# Patient Record
Sex: FEMALE | Race: AMERICAN INDIAN OR ALASKA NATIVE
[De-identification: names, ages, dates, MRNs, and addresses within clinical notes are randomized per-mention and may not be internally consistent; named-entity substitution may affect disease eponyms.]

---
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
## 2020-11-23 ENCOUNTER — HOSPITAL ENCOUNTER (EMERGENCY)
Dept: HOSPITAL 5 - ED | Age: 20
LOS: 1 days | Discharge: HOME | End: 2020-11-24
Payer: MEDICAID

## 2020-11-23 VITALS — SYSTOLIC BLOOD PRESSURE: 133 MMHG | DIASTOLIC BLOOD PRESSURE: 79 MMHG

## 2020-11-23 DIAGNOSIS — Z79.899: ICD-10-CM

## 2020-11-23 DIAGNOSIS — L02.31: Primary | ICD-10-CM

## 2020-11-23 PROCEDURE — 99282 EMERGENCY DEPT VISIT SF MDM: CPT

## 2020-11-24 NOTE — EMERGENCY DEPARTMENT REPORT
- General


Chief complaint: Skin/Abscess/Foreign Body


Stated complaint: ABSCESS ON BUTTOCKS


Time Seen by Provider: 11/24/20 00:48


Source: patient


Mode of arrival: Ambulatory


Limitations: No Limitations





- History of Present Illness


Initial comments: 





pt is a 20-year-old female presents emergency room with complaints of an abscess

to the left buttock that began a week ago but worsened in the last 3 days.  She 

states that it has been increasing in size and becoming more painful.  She 

states that she has noticed a small amount of drainage.  She states that she has

been having chills but denies any fever.  She denies any nausea, vomiting, 

diarrhea, rectal pain.  She states that she has had these a few times in the 

past in the same area.  She states that she has had to have I&D's in the past.  

No past medical history.  No allergies to medications.  Last menstrual cycle 

11/5/2020.





- Related Data


                                  Previous Rx's











 Medication  Instructions  Recorded  Last Taken  Type


 


Clindamycin [Clindamycin CAP] 300 mg PO Q8H 7 Days  cap 04/29/18 Unknown Rx


 


Ibuprofen [Motrin] 600 mg PO Q8H PRN #30 tablet 04/29/18 Unknown Rx


 


Nystas/Diphen/Xyl Visc/Mylanta 15 ml MM Q4H PRN 7 Days  ml 04/29/18 Unknown Rx





[Magic Mouthwash]    


 


Prednisone [predniSONE 10 mg 10 mg PO .TAPER #1 tab.ds.pk 04/29/18 Unknown Rx





(6-Day Pack, 21 Tabs)]    


 


Clindamycin [Clindamycin CAP] 300 mg PO Q8H 10 Days #30 cap 11/11/18 Unknown Rx


 


Ibuprofen [Motrin] 800 mg PO Q8HR PRN #12 tablet 11/11/18 Unknown Rx


 


Acetaminophen/Codeine [Tylenol 1 tab PO Q6H PRN #10 tab 11/24/20 Unknown Rx





/Codeine # 3 tab]    


 


Sulfamethoxazole/Trimethoprim 1 each PO BID 7 Days #14 tablet 11/24/20 Unknown 

Rx





[Bactrim DS TAB]    











                                    Allergies











Allergy/AdvReac Type Severity Reaction Status Date / Time


 


No Known Allergies Allergy   Verified 11/11/18 19:32














Abscess Boil HPI





- HPI


Chief Complaint: Skin/Abscess/Foreign Body


Stated Complaint: ABSCESS ON BUTTOCKS


Time Seen by Provider: 11/24/20 00:48


Home Medications: 


                                  Previous Rx's











 Medication  Instructions  Recorded  Last Taken  Type


 


Clindamycin [Clindamycin CAP] 300 mg PO Q8H 7 Days  cap 04/29/18 Unknown Rx


 


Ibuprofen [Motrin] 600 mg PO Q8H PRN #30 tablet 04/29/18 Unknown Rx


 


Nystas/Diphen/Xyl Visc/Mylanta 15 ml MM Q4H PRN 7 Days  ml 04/29/18 Unknown Rx





[Magic Mouthwash]    


 


Prednisone [predniSONE 10 mg 10 mg PO .TAPER #1 tab.ds.pk 04/29/18 Unknown Rx





(6-Day Pack, 21 Tabs)]    


 


Clindamycin [Clindamycin CAP] 300 mg PO Q8H 10 Days #30 cap 11/11/18 Unknown Rx


 


Ibuprofen [Motrin] 800 mg PO Q8HR PRN #12 tablet 11/11/18 Unknown Rx


 


Acetaminophen/Codeine [Tylenol 1 tab PO Q6H PRN #10 tab 11/24/20 Unknown Rx





/Codeine # 3 tab]    


 


Sulfamethoxazole/Trimethoprim 1 each PO BID 7 Days #14 tablet 11/24/20 Unknown 

Rx





[Bactrim DS TAB]    











Allergies/Adverse Reactions: 


                                    Allergies











Allergy/AdvReac Type Severity Reaction Status Date / Time


 


No Known Allergies Allergy   Verified 11/11/18 19:32














ED Review of Systems


ROS: 


Stated complaint: ABSCESS ON BUTTOCKS


Other details as noted in HPI





Comment: All other systems reviewed and negative





ED Past Medical Hx





- Past Medical History


Previous Medical History?: No


Additional medical history: Abscess, pneumonia





- Social History


Smoking Status: Never Smoker


Substance Use Type: None





- Medications


Home Medications: 


                                Home Medications











 Medication  Instructions  Recorded  Confirmed  Last Taken  Type


 


Clindamycin [Clindamycin CAP] 300 mg PO Q8H 7 Days  cap 04/29/18  Unknown Rx


 


Ibuprofen [Motrin] 600 mg PO Q8H PRN #30 tablet 04/29/18  Unknown Rx


 


Nystas/Diphen/Xyl Visc/Mylanta 15 ml MM Q4H PRN 7 Days  ml 04/29/18  Unknown Rx





[Magic Mouthwash]     


 


Prednisone [predniSONE 10 mg 10 mg PO .TAPER #1 tab.ds.pk 04/29/18  Unknown Rx





(6-Day Pack, 21 Tabs)]     


 


Clindamycin [Clindamycin CAP] 300 mg PO Q8H 10 Days #30 cap 11/11/18  Unknown Rx


 


Ibuprofen [Motrin] 800 mg PO Q8HR PRN #12 tablet 11/11/18  Unknown Rx


 


Acetaminophen/Codeine [Tylenol 1 tab PO Q6H PRN #10 tab 11/24/20  Unknown Rx





/Codeine # 3 tab]     


 


Sulfamethoxazole/Trimethoprim 1 each PO BID 7 Days #14 tablet 11/24/20  Unknown 

Rx





[Bactrim DS TAB]     














ED Physical Exam





- General


Limitations: No Limitations


General appearance: alert, in no apparent distress





- Head


Head exam: Present: atraumatic, normocephalic





- Eye


Eye exam: Present: normal appearance





- ENT


ENT exam: Present: mucous membranes moist





- Respiratory


Respiratory exam: Absent: respiratory distress, accessory muscle use





- Neurological Exam


Neurological exam: Present: alert, oriented X3





- Psychiatric


Psychiatric exam: Present: normal affect, normal mood





- Skin


Skin exam: Present: warm, dry, other (3 cm area of induration and central 

fluctuance present to the left buttock near the gluteal cleft, no rectal or 

perineum involvement, no active drainage, no signficant surrounding erythema, 

chaperone: mary grace dumont)





ED Course


                                   Vital Signs











  11/23/20 11/24/20





  23:02 03:10


 


Temperature 98.7 F 


 


Pulse Rate 77 88


 


Respiratory 18 16





Rate  


 


Blood Pressure 133/79 


 


O2 Sat by Pulse 94 100





Oximetry  














- I & D


  ** Left Buttocks


Type of Procedure: Complex


Site: left buttock near the gluteal cleft


Blade Size: 11


I & D Procedure: betadine prep, sterile drapes applied, sterile dressing 

applied, gauze wick placed


Progress: 





Prepped with Betadine, 6 cc of 1% lidocaine with epinephrine used as anesthetic,

Betadine prep again, sterile drapes applied, 1 cm incision made with 11 blade, 

copious amounts of foul-smelling purulent drainage expressed, irrigated with 

saline, packed with iodoform gauze, patient tolerated well, no complications, 

bleeding controlled, sterile dressing applied








Chaperone during examination and procedure mary grace Dumont





ED Medical Decision Making





- Lab Data








                                   Vital Signs











  11/23/20 11/24/20





  23:02 03:10


 


Temperature 98.7 F 


 


Pulse Rate 77 88


 


Respiratory 18 16





Rate  


 


Blood Pressure 133/79 


 


O2 Sat by Pulse 94 100





Oximetry  














- Medical Decision Making





pt is a 20-year-old female presents emergency room with complaints of an abscess

to the left buttock that began a week ago but worsened in the last 3 days.  She 

states that it has been increasing in size and becoming more painful.  She 

states that she has noticed a small amount of drainage.  She states that she has

been having chills but denies any fever.  She denies any nausea, vomiting, 

diarrhea, rectal pain.  She states that she has had these a few times in the 

past in the same area.  She states that she has had to have I&D's in the past.  

No past medical history.  No allergies to medications.  Last menstrual cycle 

11/5/2020. VSS. on exam: 3 cm area of induration and central fluctuance present 

to the left buttock near the gluteal cleft, no rectal or perineum involvement, 

no active drainage, no signficant surrounding erythema, chaperone: carmen dumont  I&D performed per procedure note with copious amounts of purulent 

drainage, iodoform packing placed.  Advised patient that packing would need to 

be removed in 2 days and need to have the area reexamined.  Patient will be 

referred to general surgery given frequent abscesses and to have wound assessed.

 Patient given prescription for Tylenol with codeine and Bactrim.  Advised 

patient Please take medication as prescribed.  Do not drive or operate machinery

while taking pain medication.  Packing needs to be removed in 2 days, please 

return to the emergency room.  Please follow-up with a general surgeon.  Return 

to emergency room for any new or worsening symptoms.


Critical care attestation.: 


If time is entered above; I have spent that time in minutes in the direct care 

of this critically ill patient, excluding procedure time.








ED Disposition


Clinical Impression: 


 Abscess of buttock, left





Disposition: DC-01 TO HOME OR SELFCARE


Is pt being admited?: No


Does the pt Need Aspirin: No


Condition: Stable


Instructions:  Skin Abscess, Easy-to-Read, Incision and Drainage, Care After


Additional Instructions: 


Please take medication as prescribed.  Do not drive or operate machinery while 

taking pain medication.  Packing needs to be removed in 2 days, please return to

the emergency room.  Please follow-up with a general surgeon.  Return to 

emergency room for any new or worsening symptoms.


Prescriptions: 


Sulfamethoxazole/Trimethoprim [Bactrim DS TAB] 1 each PO BID 7 Days #14 tablet


Acetaminophen/Codeine [Tylenol /Codeine # 3 tab] 1 tab PO Q6H PRN #10 tab


 PRN Reason: pain


Referrals: 


[facility] [Primary Care Provider] - 2-3 Days


CHRISTA CALLAHAN DO [Staff Physician] - 2-3 Days


Forms:  Work/School Release Form(ED)


Time of Disposition: 02:55


Print Language: ENGLISH

## 2022-07-27 ENCOUNTER — HOSPITAL ENCOUNTER (EMERGENCY)
Dept: HOSPITAL 5 - ED | Age: 22
Discharge: HOME | End: 2022-07-27
Payer: MEDICAID

## 2022-07-27 VITALS — SYSTOLIC BLOOD PRESSURE: 136 MMHG | DIASTOLIC BLOOD PRESSURE: 81 MMHG

## 2022-07-27 DIAGNOSIS — L02.31: Primary | ICD-10-CM

## 2022-07-27 PROCEDURE — 99282 EMERGENCY DEPT VISIT SF MDM: CPT

## 2022-07-27 NOTE — EMERGENCY DEPARTMENT REPORT
- General


Chief complaint: Skin/Abscess/Foreign Body


Stated complaint: FEVER/ABSCESS ON BUTTOCKS


Source: patient


Mode of arrival: Ambulatory


Limitations: No Limitations





- History of Present Illness


Initial comments: 


22-year-old female presents to the ED complaining of abscess to the left buttock

x2 days.  Patient states that she has a 8-month-old that she is currently 

breast-feeding.  States that she noticed a tender nodule and to her left buttock

and has been doing warm sitz bath without any relief.  Patient denies taking any

over-the-counter medication.  She denies having any fever chills or nausea or 

vomiting at present time.  She states that pain is a current 8 out of 10.  

Patient is alert and oriented x3.  No acute distress noted.  No ill appearance 

noted.





MD complaint: abscess/boil


Severity scale (0 -10): 10


Quality: aching


Consistency: constant


Improves with: none


Worsens with: none


Context: none


Associated symptoms: denies other symptoms





- Related Data


                                  Previous Rx's











 Medication  Instructions  Recorded  Last Taken  Type


 


Clindamycin [Clindamycin CAP] 300 mg PO Q8H 7 Days  cap 04/29/18 Unknown Rx


 


Ibuprofen [Motrin] 600 mg PO Q8H PRN #30 tablet 04/29/18 Unknown Rx


 


Nystas/Diphen/Xyl Visc/Mylanta 15 ml MM Q4H PRN 7 Days  ml 04/29/18 Unknown Rx





[Magic Mouthwash]    


 


Prednisone [predniSONE 10 mg 10 mg PO .TAPER #1 tab.ds.pk 04/29/18 Unknown Rx





(6-Day Pack, 21 Tabs)]    


 


Clindamycin [Clindamycin CAP] 300 mg PO Q8H 10 Days #30 cap 11/11/18 Unknown Rx


 


Ibuprofen [Motrin] 800 mg PO Q8HR PRN #12 tablet 11/11/18 Unknown Rx


 


Acetaminophen/Codeine [Tylenol 1 tab PO Q6H PRN #10 tab 11/24/20 Unknown Rx





/Codeine # 3 tab]    


 


Sulfamethoxazole/Trimethoprim 1 each PO BID 7 Days #14 tablet 11/24/20 Unknown 

Rx





[Bactrim DS TAB]    


 


Amoxicillin/K Clav Tab [Augmentin 1 tab PO Q12HR 10 Days #20 tab 07/27/22 

Unknown Rx





875 mg]    











                                    Allergies











Allergy/AdvReac Type Severity Reaction Status Date / Time


 


No Known Allergies Allergy   Verified 07/27/22 12:47














Abscess Boil HPI





- HPI


Chief Complaint: Skin/Abscess/Foreign Body


Stated Complaint: FEVER/ABSCESS ON BUTTOCKS


Home Medications: 


                                  Previous Rx's











 Medication  Instructions  Recorded  Last Taken  Type


 


Clindamycin [Clindamycin CAP] 300 mg PO Q8H 7 Days  cap 04/29/18 Unknown Rx


 


Ibuprofen [Motrin] 600 mg PO Q8H PRN #30 tablet 04/29/18 Unknown Rx


 


Nystas/Diphen/Xyl Visc/Mylanta 15 ml MM Q4H PRN 7 Days  ml 04/29/18 Unknown Rx





[Magic Mouthwash]    


 


Prednisone [predniSONE 10 mg 10 mg PO .TAPER #1 tab.ds.pk 04/29/18 Unknown Rx





(6-Day Pack, 21 Tabs)]    


 


Clindamycin [Clindamycin CAP] 300 mg PO Q8H 10 Days #30 cap 11/11/18 Unknown Rx


 


Ibuprofen [Motrin] 800 mg PO Q8HR PRN #12 tablet 11/11/18 Unknown Rx


 


Acetaminophen/Codeine [Tylenol 1 tab PO Q6H PRN #10 tab 11/24/20 Unknown Rx





/Codeine # 3 tab]    


 


Sulfamethoxazole/Trimethoprim 1 each PO BID 7 Days #14 tablet 11/24/20 Unknown 

Rx





[Bactrim DS TAB]    


 


Amoxicillin/K Clav Tab [Augmentin 1 tab PO Q12HR 10 Days #20 tab 07/27/22 

Unknown Rx





875 mg]    











Allergies/Adverse Reactions: 


                                    Allergies











Allergy/AdvReac Type Severity Reaction Status Date / Time


 


No Known Allergies Allergy   Verified 07/27/22 12:47














ED Review of Systems


ROS: 


Stated complaint: FEVER/ABSCESS ON BUTTOCKS


Other details as noted in HPI





Constitutional: denies: chills, fever


Eyes: denies: eye pain, eye discharge, vision change


ENT: denies: ear pain, throat pain


Respiratory: denies: cough, shortness of breath, wheezing


Cardiovascular: denies: chest pain, palpitations


Endocrine: no symptoms reported


Gastrointestinal: denies: abdominal pain, nausea, diarrhea


Genitourinary: denies: urgency, dysuria, discharge


Musculoskeletal: denies: back pain, joint swelling, arthralgia


Skin: rash, lesions


Neurological: denies: headache, weakness, paresthesias


Psychiatric: denies: anxiety, depression


Hematological/Lymphatic: denies: easy bleeding, easy bruising





ED Past Medical Hx





- Past Medical History


Additional medical history: Abscess, pneumonia





- Social History


Smoking Status: Never Smoker


Substance Use Type: None





- Medications


Home Medications: 


                                Home Medications











 Medication  Instructions  Recorded  Confirmed  Last Taken  Type


 


Clindamycin [Clindamycin CAP] 300 mg PO Q8H 7 Days  cap 04/29/18  Unknown Rx


 


Ibuprofen [Motrin] 600 mg PO Q8H PRN #30 tablet 04/29/18  Unknown Rx


 


Nystas/Diphen/Xyl Visc/Mylanta 15 ml MM Q4H PRN 7 Days  ml 04/29/18  Unknown Rx





[Magic Mouthwash]     


 


Prednisone [predniSONE 10 mg 10 mg PO .TAPER #1 tab.ds.pk 04/29/18  Unknown Rx





(6-Day Pack, 21 Tabs)]     


 


Clindamycin [Clindamycin CAP] 300 mg PO Q8H 10 Days #30 cap 11/11/18  Unknown Rx


 


Ibuprofen [Motrin] 800 mg PO Q8HR PRN #12 tablet 11/11/18  Unknown Rx


 


Acetaminophen/Codeine [Tylenol 1 tab PO Q6H PRN #10 tab 11/24/20  Unknown Rx





/Codeine # 3 tab]     


 


Sulfamethoxazole/Trimethoprim 1 each PO BID 7 Days #14 tablet 11/24/20  Unknown 

Rx





[Bactrim DS TAB]     


 


Amoxicillin/K Clav Tab [Augmentin 1 tab PO Q12HR 10 Days #20 tab 07/27/22  

Unknown Rx





875 mg]     














ED Physical Exam





- General


Limitations: No Limitations


General appearance: alert, in no apparent distress





- Head


Head exam: Present: atraumatic, normocephalic





- Eye


Eye exam: Present: normal appearance





- ENT


ENT exam: Present: mucous membranes moist





- Neck


Neck exam: Present: normal inspection





- Respiratory


Respiratory exam: Present: normal lung sounds bilaterally.  Absent: respiratory 

distress





- Cardiovascular


Cardiovascular Exam: Present: regular rate, normal rhythm.  Absent: systolic 

murmur, diastolic murmur, rubs, gallop





- GI/Abdominal


GI/Abdominal exam: Present: soft, normal bowel sounds





- Extremities Exam


Extremities exam: Present: normal inspection





- Back Exam


Back exam: Present: normal inspection





- Neurological Exam


Neurological exam: Present: alert, oriented X3





- Psychiatric


Psychiatric exam: Present: normal affect, normal mood





- Skin


Skin exam: Present: warm, dry, intact, normal color.  Absent: rash





ED Course





                                   Vital Signs











  07/27/22





  12:44


 


Temperature 100.9 F H


 


Pulse Rate 100 H


 


Respiratory 18





Rate 


 


Blood Pressure 130/84





[Left] 


 


O2 Sat by Pulse 100





Oximetry 














ED Medical Decision Making





- Medical Decision Making


22-year-old female presents to the ED complaining of abscess to the left buttock

 x2 days.  Patient states that she has a 8-month-old that she is currently 

breast-feeding.  States that she noticed a tender nodule and to her left buttock

 and has been doing warm sitz bath without any relief.  Patient denies taking 

any over-the-counter medication.  She denies having any fever chills or nausea 

or vomiting at present time.  She states that pain is a current 8 out of 10.  

Patient is alert and oriented x3.  No acute distress noted.  No ill appearance 

noted.  Physical examination patient has hard tender nodule noted to the left bu

ttock small area noted with fluctuant.  Incision draining to the area.  Mild 

purulent drainage expelled 





Rechecked the patient is resting  quietly , comfortable and feeling better. I 

discussed the results of diagnostic study, my clinical impression and the plan 

for further treatment with the patient. Patient agrees with plan and discharge 

at this present time. All question addressed.





I have given the patient instruction regarding a diagnosis ,expectation ,follow-

up and return precaution. I explained to the patient that emergent condition may

 arise and to return to the ED for new worsen and any new persisting condition. 

I have explained the importance of following up with the primary care physician 

or referral physician listed below has instructed. The patient verbalized 

understanding of discharge instruction.











Critical care attestation.: 


If time is entered above; I have spent that time in minutes in the direct care 

of this critically ill patient, excluding procedure time.








ED Disposition


Clinical Impression: 


 Abscess





Disposition: 01 HOME / SELF CARE / HOMELESS


Is pt being admited?: No


Does the pt Need Aspirin: No


Condition: Stable


Instructions:  Skin Abscess, Easy-to-Read


Additional Instructions: 


Continue to put to do warm sitz bath


Take medication as prescribed


May take Tylenol over-the-counter for fever or pain


Prescriptions: 


Amoxicillin/K Clav Tab [Augmentin 875 mg] 1 tab PO Q12HR 10 Days #20 tab


Referrals: 


[facility] [Provider Group] - 3-5 Days


Forms:  Work/School Release Form(ED)


Time of Disposition: 14:55